# Patient Record
Sex: MALE | Race: WHITE | NOT HISPANIC OR LATINO | Employment: FULL TIME | URBAN - METROPOLITAN AREA
[De-identification: names, ages, dates, MRNs, and addresses within clinical notes are randomized per-mention and may not be internally consistent; named-entity substitution may affect disease eponyms.]

---

## 2022-04-14 ENCOUNTER — OFFICE VISIT (OUTPATIENT)
Dept: UROLOGY | Facility: CLINIC | Age: 37
End: 2022-04-14
Payer: COMMERCIAL

## 2022-04-14 VITALS
SYSTOLIC BLOOD PRESSURE: 127 MMHG | TEMPERATURE: 98 F | HEART RATE: 67 BPM | DIASTOLIC BLOOD PRESSURE: 71 MMHG | WEIGHT: 225 LBS | HEIGHT: 69 IN | RESPIRATION RATE: 18 BRPM | BODY MASS INDEX: 33.33 KG/M2

## 2022-04-14 DIAGNOSIS — Z30.2 ENCOUNTER FOR STERILIZATION IN MALE: Primary | ICD-10-CM

## 2022-04-14 PROCEDURE — 99204 PR OFFICE/OUTPT VISIT, NEW, LEVL IV, 45-59 MIN: ICD-10-PCS | Mod: S$GLB,,, | Performed by: UROLOGY

## 2022-04-14 PROCEDURE — 99204 OFFICE O/P NEW MOD 45 MIN: CPT | Mod: S$GLB,,, | Performed by: UROLOGY

## 2022-04-14 NOTE — PROGRESS NOTES
Subjective:       Patient ID: Aldo Warren is a 37 y.o. male.    Chief Complaint: Sterilization (4 kids )      HPI:  37-year-old male with 4 children desires sterility:    Past Medical History: History reviewed. No pertinent past medical history.    Past Surgical Historical: History reviewed. No pertinent surgical history.     Medications:      Past Social History:   Social History     Socioeconomic History    Marital status:    Tobacco Use    Smoking status: Never Smoker    Smokeless tobacco: Former User    Tobacco comment: quit 15 years ago   Substance and Sexual Activity    Alcohol use: Yes    Sexual activity: Yes     Partners: Female       Allergies: Review of patient's allergies indicates:  No Known Allergies     Family History:   Family History   Problem Relation Age of Onset    No Known Problems Father     No Known Problems Mother         Review of Systems:  Review of Systems   Constitutional: Negative for activity change and appetite change.   HENT: Negative for congestion and dental problem.    Eyes: Negative for visual disturbance.   Respiratory: Negative for chest tightness and shortness of breath.    Cardiovascular: Negative for chest pain.   Gastrointestinal: Negative for abdominal distention and abdominal pain.   Genitourinary: Negative for decreased urine volume, difficulty urinating, dysuria, enuresis, flank pain, frequency, genital sores, hematuria, penile discharge, penile pain, penile swelling, scrotal swelling, testicular pain and urgency.   Musculoskeletal: Negative for back pain and neck pain.   Skin: Negative for color change.   Neurological: Negative for dizziness.   Hematological: Negative for adenopathy.   Psychiatric/Behavioral: Negative for agitation, behavioral problems and confusion.       Physical Exam:  Physical Exam  Constitutional:       General: He is not in acute distress.     Appearance: He is well-developed.   HENT:      Head: Normocephalic and atraumatic.       Nose: Nose normal.   Eyes:      General: No scleral icterus.     Conjunctiva/sclera: Conjunctivae normal.      Pupils: Pupils are equal, round, and reactive to light.   Neck:      Thyroid: No thyromegaly.      Trachea: No tracheal deviation.   Cardiovascular:      Rate and Rhythm: Normal rate and regular rhythm.      Heart sounds: Normal heart sounds.   Pulmonary:      Effort: Pulmonary effort is normal. No respiratory distress.      Breath sounds: Normal breath sounds. No wheezing or rales.   Abdominal:      General: Bowel sounds are normal. There is no distension.      Palpations: Abdomen is soft.      Tenderness: There is no abdominal tenderness. There is no guarding or rebound.   Genitourinary:     Penis: Normal. No tenderness.       Prostate: Normal.   Musculoskeletal:         General: No deformity. Normal range of motion.      Cervical back: Neck supple.   Lymphadenopathy:      Cervical: No cervical adenopathy.   Skin:     General: Skin is warm and dry.      Findings: No erythema or rash.   Neurological:      Mental Status: He is alert and oriented to person, place, and time.      Cranial Nerves: No cranial nerve deficit.   Psychiatric:         Behavior: Behavior normal.         Assessment/Plan:       Problem List Items Addressed This Visit    None     Visit Diagnoses     Encounter for sterilization in male    -  Primary    Relevant Orders    Vasectomy             We had a long discussion regarding vasectomy including the risks and benefits.  The patient understands the permanent nature of the procedure he also understands the potential risks of vasectomy including but not limited to injury to the testicle loss of testicle bleeding hematoma infection and testicular atrophy.  Patient understands these risks is willing to proceed we will schedule vasectomy next available date.

## 2022-05-24 DIAGNOSIS — Z30.2 ENCOUNTER FOR STERILIZATION IN MALE: Primary | ICD-10-CM

## 2022-05-26 RX ORDER — DIAZEPAM 10 MG/1
10 TABLET ORAL ONCE
Qty: 1 TABLET | Refills: 0 | Status: SHIPPED | OUTPATIENT
Start: 2022-06-23 | End: 2022-06-23

## 2022-06-03 ENCOUNTER — CLINICAL SUPPORT (OUTPATIENT)
Dept: UROLOGY | Facility: CLINIC | Age: 37
End: 2022-06-03
Payer: COMMERCIAL

## 2022-06-03 NOTE — PROGRESS NOTES
Pt here to sign consents for Vasectomy procedure. All questions answered. Pt acknowledges understanding. Valium script given. Consents signed.

## 2022-08-03 ENCOUNTER — TELEPHONE (OUTPATIENT)
Dept: UROLOGY | Facility: CLINIC | Age: 37
End: 2022-08-03
Payer: COMMERCIAL

## 2022-08-03 DIAGNOSIS — Z30.2 ENCOUNTER FOR STERILIZATION IN MALE: Primary | ICD-10-CM

## 2022-08-03 RX ORDER — HYDROCODONE BITARTRATE AND ACETAMINOPHEN 10; 325 MG/1; MG/1
1 TABLET ORAL EVERY 6 HOURS PRN
Qty: 10 TABLET | Refills: 0 | Status: SHIPPED | OUTPATIENT
Start: 2022-08-03

## 2022-08-04 ENCOUNTER — TELEPHONE (OUTPATIENT)
Dept: UROLOGY | Facility: CLINIC | Age: 37
End: 2022-08-04

## 2022-08-04 ENCOUNTER — PROCEDURE VISIT (OUTPATIENT)
Dept: UROLOGY | Facility: CLINIC | Age: 37
End: 2022-08-04
Payer: COMMERCIAL

## 2022-08-04 VITALS
WEIGHT: 223 LBS | RESPIRATION RATE: 18 BRPM | OXYGEN SATURATION: 99 % | DIASTOLIC BLOOD PRESSURE: 68 MMHG | HEART RATE: 77 BPM | BODY MASS INDEX: 32.93 KG/M2 | SYSTOLIC BLOOD PRESSURE: 128 MMHG

## 2022-08-04 DIAGNOSIS — Z30.2 ENCOUNTER FOR STERILIZATION IN MALE: Primary | ICD-10-CM

## 2022-08-04 PROCEDURE — 55250 VASECTOMY: ICD-10-PCS | Mod: S$GLB,,, | Performed by: UROLOGY

## 2022-08-04 PROCEDURE — 55250 REMOVAL OF SPERM DUCT(S): CPT | Mod: S$GLB,,, | Performed by: UROLOGY

## 2022-08-04 NOTE — PATIENT INSTRUCTIONS
"Patient Education       Vasectomy   The Basics   Written by the doctors and editors at [facility]   What is a vasectomy? -- A vasectomy is a procedure that can be done as a type of long-term birth control. After a successful vasectomy, you will not be able to get a partner pregnant.  How does a vasectomy prevent pregnancy? -- A vasectomy prevents pregnancy by blocking the path the sperm takes to leave the body (figure 1).  Sperm are made in the testicles. The testicles are found inside a skin sac called the "scrotum." Sperm are stored in the epididymis, which is a small organ that sits on top of the testicles. During ejaculation, sperm travel from the epididymis through tubes and out the end of the penis.  During a vasectomy, a doctor cuts and blocks a tube called the "vas deferens" on each side. This prevents sperm from leaving the body. After a vasectomy, you can still ejaculate fluid, called semen. But the semen does not have any sperm in it.  Why might I choose to have a vasectomy? -- You might choose to have a vasectomy if you do not want any more biological children, and do not want to use birth control each time you have sex.  Let your doctor know if you have any questions or worries about having a vasectomy. They can talk with you and tell you about the procedure.  Some people choose to have a sample of their sperm saved before they have a vasectomy. If you want to have a sample of your sperm saved, talk with your doctor.  What happens during a vasectomy? -- A vasectomy is done in a doctor's office and takes about 30 minutes. During the procedure, a doctor numbs the skin on the scrotum. Then they make a small cut in the skin to reach the vas deferens, cut it, and seal it off. The procedure does not hurt, but some people can feel cramping or pulling.  What happens after a vasectomy? -- You can go home right after the procedure, but you will need to rest for 2 to 3 days. After a vasectomy, you will likely have " "some discomfort and bruising in your scrotum. Your doctor will tell you which pain-relieving medicines to take. They might also prescribe a medicine to treat your pain.  Your doctor will give you instructions about what you should and should not do after your vasectomy. They will probably tell you to:  Wear a jock strap to hold the bandage in place  Not bathe or swim for 1 to 2 days  Not lift heavy objects or exercise too hard for 7 days  Wait 7 days to have sex. After that, you must use another form of birth control for a few months to prevent pregnancy.  What are the side effects of a vasectomy? -- Side effects are uncommon, but can occur. They can include:  Severe pain in the scrotum  Bleeding in the scrotum  Infection of the skin around the cut  If you have any side effects, let your doctor know. Some side effects go away over time, but others might need treatment.  How long does it take for a vasectomy to work? -- It takes a few months for a vasectomy to work. That's because the tubes can still have sperm in them.  You will need to ejaculate 20 or more times after a vasectomy to clear out all the sperm from the tubes. Because of this, it's important to use another type of birth control for a few months to prevent getting a partner pregnant.  How will I know my vasectomy worked? -- Yes. You will have a follow-up test called a "sperm count" to make sure your semen does not have any sperm in it. This is usually done 3 months after their vasectomy.  A sperm count checks how many sperm are in a sample of semen. For this test, you will need to provide a sample of your semen.  If your sample has no sperm, you can stop using other birth control because you will not be able to get a partner pregnant. But if your sample does have sperm in it, you could get a partner pregnant. You should still use birth control until you have another sperm count done.  What if I change my mind after having a vasectomy? -- If you have had " "a vasectomy and decide that you do want to be able to get your partner pregnant, talk with your doctor. A surgery to reconnect the vas deferens and open the sperm's path can be done. But this surgery does not always work.  Does a vasectomy prevent me getting a disease from sex? -- No. A vasectomy does not prevent you from getting or spreading a disease from sex. To prevent getting or spreading a disease from sex, you should use a type of protection called a condom.  All topics are updated as new evidence becomes available and our peer review process is complete.  This topic retrieved from [employer] on: Sep 21, 2021.  Topic 67366 Version 8.0  Release: 29.4.2 - C29.263  © 2021 UpToDate, Inc. and/or its affiliates. All rights reserved.  figure 1: Vasectomy     A vasectomy is a procedure that can be done as a type of long-term birth control. After a successful vasectomy, you will not be able to get a partner pregnant.Sperm are made in the testicles and stored in the epididymis. During a vasectomy, a doctor cuts and blocks off the two tubes that carry sperm out of the epididymis. These tubes - one on the left and one on the right - are called the "vas deferens." After the surgery, sperm get reabsorbed into the body. The sperm do not come out during ejaculation.  Graphic 89695 Version 13.0    Consumer Information Use and Disclaimer   This information is not specific medical advice and does not replace information you receive from your health care provider. This is only a brief summary of general information. It does NOT include all information about conditions, illnesses, injuries, tests, procedures, treatments, therapies, discharge instructions or life-style choices that may apply to you. You must talk with your health care provider for complete information about your health and treatment options. This information should not be used to decide whether or not to accept your health care provider's advice, instructions or " recommendations. Only your health care provider has the knowledge and training to provide advice that is right for you. The use of this information is governed by the [employer] End User License Agreement, available at [email or web address]://[email or web address]/en/solutions/SEWORKS/about/tom.The use of [employer] content is governed by the [employer] Terms of Use. ©2021 UpToDate, Inc. All rights reserved.  Copyright   © 2021 UpToDate, Inc. and/or its affiliates. All rights reserved.

## 2022-08-04 NOTE — PROCEDURES
"Vasectomy    Date/Time: 8/4/2022 1:30 PM  Performed by: Severino Bocanegra MD  Authorized by: Severino Bocanegra MD     Consent Done?:  Yes (Written)  Time out: Immediately prior to procedure a "time out" was called to verify the correct patient, procedure, equipment, support staff and site/side marked as required.    Indications:  [place] male  Patient sedated: No    Preparation: Patient was prepped and draped in usual sterile fashion    Incisions:  1  Length vas excised:  2.5 cm  Vas:  Fulgurated and Buried  Sutures:  3-0 chromic SH  Same procedure performed on both sides    Patient tolerance:  Patient tolerated the procedure well with no immediate complications     Patient was brought to the procedure room placed on table in supine position he was then prepped and draped in the usual sterile fashion. A 2 cm incision was made upper portion of the midline of the scrotum secured into the dartos fascia of the right vas deferens was isolated and skeletonized using the Vas clamps, and at this point a 2 cm segment of the vas deferens was excised, both ends of the vas were fulgurated the proximal end was dunked into the surrounding dartos and pursestring closed the that is on the right side was returned to the scrotum the identical procedure was done on the contralateral side the incision was closed with a 3 O chromic suture patient tolerated the procedure with oral complications.      "

## 2022-08-04 NOTE — TELEPHONE ENCOUNTER
----- Message from Marianna Liu sent at 8/4/2022  9:59 AM CDT -----  Patient need to speak with nurse regarding his scheduled procedure, 8/4. Call back number [phone]. Tks

## 2022-09-09 LAB
ABSTINENCE: 5 DAYS (ref 3–7)
Lab: 746
MICROSCOPIC EXAM: NORMAL
RECEIVED WITHIN 1 HOUR: YES
SPERM SEEN: NORMAL SPERM/HPF
TIME DELIVERED: 830
VOLUME: 3.5 ML

## 2023-05-26 ENCOUNTER — TELEPHONE (OUTPATIENT)
Dept: UROLOGY | Facility: CLINIC | Age: 38
End: 2023-05-26
Payer: COMMERCIAL

## 2023-05-26 DIAGNOSIS — Z30.2 ENCOUNTER FOR STERILIZATION IN MALE: Primary | ICD-10-CM

## 2023-05-26 NOTE — TELEPHONE ENCOUNTER
Pt request to repeat semen analysis to make sure everything is still good. Order placed and he will come  cup and order. [facility]

## 2023-05-26 NOTE — TELEPHONE ENCOUNTER
----- Message from Yara Epperson sent at 5/26/2023  8:19 AM CDT -----  Contact: self  Pt needs to drop off a sample(urine) for health check (post vasectomy) pls call  [phone] if possible today

## 2023-05-29 ENCOUNTER — TELEPHONE (OUTPATIENT)
Dept: UROLOGY | Facility: CLINIC | Age: 38
End: 2023-05-29
Payer: COMMERCIAL

## 2023-05-29 NOTE — TELEPHONE ENCOUNTER
Semen analysis done for yearly check up requested by patient. Dr. Bocanegra reviewed results and I called pt to inform he is sterile, no sperm seen. Pt verbalized understanding. Paper sent to scan. [facility]

## 2024-08-21 ENCOUNTER — TELEPHONE (OUTPATIENT)
Dept: UROLOGY | Facility: CLINIC | Age: 39
End: 2024-08-21
Payer: COMMERCIAL

## 2024-08-21 DIAGNOSIS — Z30.2 ENCOUNTER FOR STERILIZATION IN MALE: Primary | ICD-10-CM

## 2024-08-21 NOTE — TELEPHONE ENCOUNTER
Pt calling to have another semen analysis done. Pt had vas done two years ago and I informed him we only require one analysis 3 months post vas. Pt had one last year and would like one now and this will be it pt states. He is just nervous and wants to confirm. Order placed.    ----- Message from Carol Ramey sent at 8/21/2024  8:36 AM CDT -----  Type:  Patient Requesting Referral    Who Called:pt  Does the patient already have the specialty appointment scheduled?:no  If yes, what is the date of that appointment?:na  Referral to What Specialty:specimen  Reason for Referral:follow up after vasectomy  Does the patient want the referral with a specific physician?:na  [facility]  Patient Requesting a Response?:yes  Would the patient rather a call back or a response via [facility]? call  Best Call Back Number:[phone]    Additional Information: requesting a semen cup to test after vasectomy

## 2024-08-23 ENCOUNTER — TELEPHONE (OUTPATIENT)
Dept: UROLOGY | Facility: CLINIC | Age: 39
End: 2024-08-23
Payer: COMMERCIAL

## 2024-08-23 LAB
ABSTINENCE: 7 DAYS (ref 3–7)
Lab: 1315
RECEIVED WITHIN 1 HOUR: YES
SPERM SEEN: NORMAL SPERM/HPF
TIME DELIVERED: 1340
VOLUME: 5 ML

## 2024-08-23 NOTE — TELEPHONE ENCOUNTER
Spoke with pt and informed of sterile resultss.    ----- Message from Karley Moss NP sent at 8/23/2024  3:31 PM CDT -----  Please notify patient he is sterile based on semen analysis results